# Patient Record
Sex: FEMALE | URBAN - METROPOLITAN AREA
[De-identification: names, ages, dates, MRNs, and addresses within clinical notes are randomized per-mention and may not be internally consistent; named-entity substitution may affect disease eponyms.]

---

## 2021-03-31 ENCOUNTER — IMMUNIZATION (OUTPATIENT)
Dept: PRIMARY CARE CLINIC | Facility: CLINIC | Age: 66
End: 2021-03-31

## 2021-03-31 DIAGNOSIS — Z23 NEED FOR VACCINATION: Primary | ICD-10-CM

## 2021-03-31 PROCEDURE — 91303 PR SARSCOV2 VAC AD26 .5ML IM: ICD-10-PCS | Mod: S$GLB,,, | Performed by: INTERNAL MEDICINE

## 2021-03-31 PROCEDURE — 91303 PR SARSCOV2 VAC AD26 .5ML IM: CPT | Mod: S$GLB,,, | Performed by: INTERNAL MEDICINE

## 2021-03-31 PROCEDURE — 0031A PR IMMUNIZ ADMIN, SARS-COV-2 COVID-19 VACC, 5X10VP/0.5ML: ICD-10-PCS | Mod: CV19,S$GLB,, | Performed by: INTERNAL MEDICINE

## 2021-03-31 PROCEDURE — 0031A PR IMMUNIZ ADMIN, SARS-COV-2 COVID-19 VACC, 5X10VP/0.5ML: CPT | Mod: CV19,S$GLB,, | Performed by: INTERNAL MEDICINE

## 2021-05-29 ENCOUNTER — RECORDS - HEALTHEAST (OUTPATIENT)
Dept: ADMINISTRATIVE | Facility: CLINIC | Age: 66
End: 2021-05-29

## 2023-03-11 ENCOUNTER — OFFICE VISIT (OUTPATIENT)
Dept: URGENT CARE | Facility: CLINIC | Age: 68
End: 2023-03-11
Payer: MEDICARE

## 2023-03-11 VITALS
RESPIRATION RATE: 16 BRPM | WEIGHT: 145 LBS | TEMPERATURE: 99 F | SYSTOLIC BLOOD PRESSURE: 114 MMHG | HEIGHT: 64 IN | HEART RATE: 93 BPM | OXYGEN SATURATION: 95 % | BODY MASS INDEX: 24.75 KG/M2 | DIASTOLIC BLOOD PRESSURE: 82 MMHG

## 2023-03-11 DIAGNOSIS — R05.9 COUGH, UNSPECIFIED TYPE: ICD-10-CM

## 2023-03-11 DIAGNOSIS — U07.1 COVID-19 VIRUS INFECTION: ICD-10-CM

## 2023-03-11 DIAGNOSIS — H00.011 HORDEOLUM EXTERNUM OF RIGHT UPPER EYELID: Primary | ICD-10-CM

## 2023-03-11 LAB
CTP QC/QA: YES
SARS-COV-2 AG RESP QL IA.RAPID: POSITIVE

## 2023-03-11 PROCEDURE — 87811 SARS-COV-2 COVID19 W/OPTIC: CPT | Mod: QW,S$GLB,, | Performed by: EMERGENCY MEDICINE

## 2023-03-11 PROCEDURE — 99203 OFFICE O/P NEW LOW 30 MIN: CPT | Mod: S$GLB,,, | Performed by: EMERGENCY MEDICINE

## 2023-03-11 PROCEDURE — 87811 SARS CORONAVIRUS 2 ANTIGEN POCT, MANUAL READ: ICD-10-PCS | Mod: QW,S$GLB,, | Performed by: EMERGENCY MEDICINE

## 2023-03-11 PROCEDURE — 99203 PR OFFICE/OUTPT VISIT, NEW, LEVL III, 30-44 MIN: ICD-10-PCS | Mod: S$GLB,,, | Performed by: EMERGENCY MEDICINE

## 2023-03-11 RX ORDER — DOXYCYCLINE 100 MG/1
100 CAPSULE ORAL EVERY 12 HOURS
Qty: 20 CAPSULE | Refills: 0 | Status: SHIPPED | OUTPATIENT
Start: 2023-03-11 | End: 2023-03-21

## 2023-03-11 NOTE — PROGRESS NOTES
"Subjective:       Patient ID: Mona Daniels is a 67 y.o. female.    Vitals:  height is 5' 4" (1.626 m) and weight is 65.8 kg (145 lb). Her oral temperature is 98.7 °F (37.1 °C). Her blood pressure is 114/82 and her pulse is 93. Her respiration is 16 and oxygen saturation is 95%.     Chief Complaint: URI    Patient presents today with a cough that is productive of clear sputum starting a few days ago. Patient also has a stye to the right eye.   Patient tried using a emma pot and had some relief.     PATIENT IS A 67 YEAR OLD WITH 3-4 DAYS OF URI SYMPTOMS AS WELL AS RIGHT UPPER EYELID SWELLING CONSISTENT WITH STYE. NO FEVERS, NO CHILLS, NO SOB, MILD FATIGUE AND WEAKNESS. NO CHANGE IN VISION.    URI   This is a new problem. The current episode started 1 to 4 weeks ago. The problem has been unchanged. There has been no fever. Associated symptoms include congestion, coughing and sneezing. Pertinent negatives include no abdominal pain, chest pain, diarrhea, dysuria, ear pain, nausea, neck pain, rash, sinus pain, sore throat or vomiting.   Constitution: Negative for chills, fatigue and fever.   HENT:  Positive for congestion. Negative for ear pain, sinus pain and sore throat.    Neck: Negative for neck pain and neck stiffness.   Cardiovascular:  Negative for chest pain, palpitations and sob on exertion.   Eyes:  Negative for eye pain and vision loss.   Respiratory:  Positive for cough. Negative for shortness of breath and asthma.    Gastrointestinal:  Negative for abdominal pain, nausea, vomiting and diarrhea.   Genitourinary:  Negative for dysuria, frequency and hematuria.   Musculoskeletal:  Negative for pain, abnormal ROM of joint and back pain.   Skin:  Negative for rash and wound.   Allergic/Immunologic: Positive for sneezing. Negative for seasonal allergies and asthma.   Neurological:  Negative for dizziness, light-headedness and altered mental status.   Psychiatric/Behavioral:  Negative for altered mental status " and confusion.      Objective:      Physical Exam   Constitutional: She is oriented to person, place, and time. She appears well-developed. She is cooperative.  Non-toxic appearance. She does not appear ill. No distress.   HENT:   Head: Normocephalic and atraumatic.   Ears:   Right Ear: Hearing, tympanic membrane, external ear and ear canal normal.   Left Ear: Hearing, tympanic membrane, external ear and ear canal normal.   Nose: Congestion present. No mucosal edema, rhinorrhea or nasal deformity. No epistaxis. Right sinus exhibits no maxillary sinus tenderness and no frontal sinus tenderness. Left sinus exhibits no maxillary sinus tenderness and no frontal sinus tenderness.   Mouth/Throat: Uvula is midline, oropharynx is clear and moist and mucous membranes are normal. No trismus in the jaw. Normal dentition. No uvula swelling. No oropharyngeal exudate, posterior oropharyngeal edema or posterior oropharyngeal erythema.   Eyes: Conjunctivae and lids are normal. Pupils are equal, round, and reactive to light. Right eye exhibits no discharge. Left eye exhibits no discharge. No scleral icterus. Extraocular movement intact      Comments: RIGHT UPPER MIDLID STYE WITH PUSTULAR HEAD. ABLE TO USE 18 GAUGE NEEDLE TO RELEASE PUS, AND WILL PLACE ON ANTIBIOTICS, ENCOURAGE WARM COMPRESSES, AND FOLLOW UP EYE DOCTOR.    Neck: Trachea normal and phonation normal. Neck supple. No edema present. No erythema present. No neck rigidity present.   Cardiovascular: Normal rate, regular rhythm, normal heart sounds and normal pulses.   Pulmonary/Chest: Effort normal and breath sounds normal. No respiratory distress. She has no decreased breath sounds. She has no rhonchi.   Abdominal: Normal appearance.   Musculoskeletal: Normal range of motion.         General: No deformity. Normal range of motion.   Neurological: She is alert and oriented to person, place, and time. She exhibits normal muscle tone. Coordination normal.   Skin: Skin is warm,  dry, intact, not diaphoretic and not pale.   Psychiatric: Her speech is normal and behavior is normal. Judgment and thought content normal.   Nursing note and vitals reviewed.       Assessment:       1. Hordeolum externum of right upper eyelid    2. Cough, unspecified type    3. COVID-19 virus infection          Plan:         Hordeolum externum of right upper eyelid    Cough, unspecified type  -     SARS Coronavirus 2 Antigen, POCT Manual Read    COVID-19 virus infection    Other orders  -     nirmatrelvir-ritonavir 300 mg (150 mg x 2)-100 mg copackaged tablets (EUA); Take 3 tablets by mouth 2 (two) times daily for 5 days. Each dose contains 2 nirmatrelvir (pink tablets) and 1 ritonavir (white tablet). Take all 3 tablets together  Dispense: 30 tablet; Refill: 0  -     doxycycline (VIBRAMYCIN) 100 MG Cap; Take 1 capsule (100 mg total) by mouth every 12 (twelve) hours. for 10 days  Dispense: 20 capsule; Refill: 0         Patient Instructions   DOXYCYCLINE ANTIBIOTIC FOR INFECTED STYE  WARM COMPRESSES TO THE RIGHT EYE  FOLLOW UP WITH EYE DOCTOR IF NOT MUCH IMPROVED IN 1-2 WEEKS    REST, HYDRATE  COVID SWAB POSITIVE  TYLENOL OR IBUPROFEN FOR FEVER OR BODY ACHES  OTC COUGH AND COLD MEDICINE AS NEEDED  PAXLOVID RX    RETURN FOR ANY CONCERNS OR PROBLEMS, AND GO TO THE ER IF WORSE IN ANY WAY

## 2023-03-11 NOTE — PATIENT INSTRUCTIONS
DOXYCYCLINE ANTIBIOTIC FOR INFECTED STYE  WARM COMPRESSES TO THE RIGHT EYE  FOLLOW UP WITH EYE DOCTOR IF NOT MUCH IMPROVED IN 1-2 WEEKS    REST, HYDRATE  COVID SWAB POSITIVE  TYLENOL OR IBUPROFEN FOR FEVER OR BODY ACHES  OTC COUGH AND COLD MEDICINE AS NEEDED  [facility]XLOVID RX    RETURN FOR ANY CONCERNS OR PROBLEMS, AND GO TO THE ER IF WORSE IN ANY WAY